# Patient Record
Sex: MALE | Race: BLACK OR AFRICAN AMERICAN | Employment: FULL TIME | URBAN - METROPOLITAN AREA
[De-identification: names, ages, dates, MRNs, and addresses within clinical notes are randomized per-mention and may not be internally consistent; named-entity substitution may affect disease eponyms.]

---

## 2021-10-17 ENCOUNTER — HOSPITAL ENCOUNTER (EMERGENCY)
Age: 56
Discharge: HOME | End: 2021-10-17
Payer: COMMERCIAL

## 2021-10-17 ENCOUNTER — APPOINTMENT (OUTPATIENT)
Dept: GENERAL RADIOLOGY | Age: 56
End: 2021-10-17
Payer: COMMERCIAL

## 2021-10-17 VITALS
WEIGHT: 202 LBS | SYSTOLIC BLOOD PRESSURE: 156 MMHG | OXYGEN SATURATION: 96 % | TEMPERATURE: 99.9 F | HEART RATE: 96 BPM | RESPIRATION RATE: 18 BRPM | DIASTOLIC BLOOD PRESSURE: 82 MMHG

## 2021-10-17 DIAGNOSIS — U07.1 COVID-19: ICD-10-CM

## 2021-10-17 DIAGNOSIS — R05.9 COUGH: Primary | ICD-10-CM

## 2021-10-17 PROCEDURE — 71046 X-RAY EXAM CHEST 2 VIEWS: CPT

## 2021-10-17 PROCEDURE — 99283 EMERGENCY DEPT VISIT LOW MDM: CPT

## 2021-10-17 RX ORDER — CITALOPRAM 40 MG/1
TABLET ORAL
COMMUNITY
Start: 2021-09-24

## 2021-10-17 RX ORDER — ATORVASTATIN CALCIUM 40 MG/1
TABLET, FILM COATED ORAL
COMMUNITY
Start: 2021-09-24

## 2021-10-17 RX ORDER — BENZONATATE 100 MG/1
100 CAPSULE ORAL 3 TIMES DAILY PRN
Qty: 30 CAPSULE | Refills: 0 | Status: SHIPPED | OUTPATIENT
Start: 2021-10-17 | End: 2021-10-24

## 2021-10-17 RX ORDER — AMLODIPINE BESYLATE 10 MG/1
TABLET ORAL
COMMUNITY
Start: 2021-09-24

## 2021-10-17 ASSESSMENT — ENCOUNTER SYMPTOMS
COUGH: 1
RHINORRHEA: 0
SHORTNESS OF BREATH: 0
VOMITING: 0
DIARRHEA: 0
ABDOMINAL PAIN: 0
NAUSEA: 0

## 2021-10-17 NOTE — ED PROVIDER NOTES
[facility]        Pt Name: Swetha Avilez  MRN: [id number]  [place] 1965  Date of evaluation: 10/17/2021  Provider: Umberto Groves PA-C  PCP: No primary care provider on file. Note Started: 2:38 PM EDT       OTTONIEL. I have evaluated this patient. My supervising physician was available for consultation. CHIEF COMPLAINT       Chief Complaint   Patient presents with    Positive For Covid-19     Pt reports COVID +, 10/11/2021, started steroid pack, feels congested today        HISTORY OF PRESENT ILLNESS   (Location, Timing/Onset, Context/Setting, Quality, Duration, Modifying Factors, Severity, Associated Signs and Symptoms)  Note limiting factors. Chief Complaint: Cough, COVID-19    Swetha Avilez is a 49 y.o. male who presents to the emergency department today for evaluation for cough, in relation to COVID-19. The patient states that he is not vaccinated. The patient states that for the past 10 days he has been experiencing fever, congestion, and some intermittent body aches. The patient states that initially he had a fever of 103, however this has resolved. The patient states that he is actually not had a fever within the past week. The patient states that he saw his primary care physician, and he states that he was tested positive for COVID-19 1 week ago. Patient states that he has had some nasal congestion, however today when he woke up he states he did have a cough. Patient states that his cough is dry, there is no sputum production or hemoptysis. The patient states he has not yet had a cough with his COVID-19 symptoms, this was concerning to him, and prompted his visit to the ED. The patient does not have any chest pain. He does not have any shortness of breath. Cough is essentially resolved at this time. Patient denies any abdominal pain, nausea, vomiting or diarrhea. No urinary symptoms.   The patient states that he has already completed a Z-Jaron and is currently on a Medrol Dosepak. Patient otherwise has no complaints at this time. Nursing Notes were all reviewed and agreed with or any disagreements were addressed in the HPI. REVIEW OF SYSTEMS    (2-9 systems for level 4, 10 or more for level 5)     Review of Systems   Constitutional: Negative for activity change, appetite change, chills and fever. HENT: Negative for congestion and rhinorrhea. Respiratory: Positive for cough. Negative for shortness of breath. Cardiovascular: Negative for chest pain. Gastrointestinal: Negative for abdominal pain, diarrhea, nausea and vomiting. Genitourinary: Negative for difficulty urinating, dysuria and hematuria. Positives and Pertinent negatives as per HPI. Except as noted above in the ROS, all other systems were reviewed and negative. PAST MEDICAL HISTORY   History reviewed. No pertinent past medical history. SURGICAL HISTORY   History reviewed. No pertinent surgical history. [address]       Discharge Medication List as of 10/17/2021  2:24 PM      CONTINUE these medications which have NOT CHANGED    Details   amLODIPine (NORVASC) 10 MG tablet Historical Med      atorvastatin (LIPITOR) 40 MG tablet Historical Med      citalopram (CELEXA) 40 MG tablet Historical Med      metFORMIN (GLUCOPHAGE) 1000 MG tablet Historical Med               ALLERGIES     Patient has no known allergies. FAMILYHISTORY     History reviewed. No pertinent family history.        SOCIAL HISTORY       Social History     Tobacco Use    Smoking status: Not on file   Substance Use Topics    Alcohol use: Not on file    Drug use: Not on file       SCREENINGS             PHYSICAL EXAM    (up to 7 for level 4, 8 or more for level 5)     ED Triage Vitals [10/17/21 1243]   BP Temp Temp src Pulse Resp SpO2 Height Weight   (!) 156/82 99.9 °F (37.7 °C) -- 96 18 96 % -- 202 lb (91.6 kg)       Physical Exam  Vitals and nursing note reviewed. Constitutional:       Appearance: He is well-developed. He is not diaphoretic. HENT:      Head: Normocephalic and atraumatic. Right Ear: External ear normal.      Left Ear: External ear normal.      Nose: Nose normal.   Eyes:      General:         Right eye: No discharge. Left eye: No discharge. Neck:      Trachea: No tracheal deviation. Cardiovascular:      Rate and Rhythm: Normal rate and regular rhythm. Heart sounds: No murmur heard. Pulmonary:      Effort: Pulmonary effort is normal. No respiratory distress. Comments: Diminished aeration throughout lungfields  Abdominal:      General: Bowel sounds are normal. There is no distension. Palpations: Abdomen is soft. Tenderness: There is no abdominal tenderness. There is no guarding. Musculoskeletal:         General: Normal range of motion. Cervical back: Normal range of motion and neck supple. Skin:     General: Skin is warm and dry. Neurological:      Mental Status: He is alert and oriented to person, place, and time. Psychiatric:         Behavior: Behavior normal.         DIAGNOSTIC RESULTS   LABS:    Labs Reviewed - No data to display    When ordered only abnormal lab results are displayed. All other labs were within normal range or not returned as of this dictation. EKG: When ordered, EKG's are interpreted by the Emergency Department Physician in the absence of a cardiologist.  Please see their note for interpretation of EKG. RADIOLOGY:   Non-plain film images such as CT, Ultrasound and MRI are read by the radiologist. Plain radiographic images are visualized and preliminarily interpreted by the ED Provider with the below findings:        Interpretation per the Radiologist below, if available at the time of this note:    XR CHEST (2 VW)   Final Result   Patchy peripheral parenchymal opacities are seen throughout the lungs,   compatible with pneumonia.   By report, there is a history of COVID-19   infection           XR CHEST (2 VW)    Result Date: 10/17/2021  EXAMINATION: TWO XRAY VIEWS OF THE CHEST 10/17/2021 1:09 pm COMPARISON: None. HISTORY: ORDERING SYSTEM PROVIDED HISTORY: Chest Discomfort TECHNOLOGIST PROVIDED HISTORY: Reason for exam:->Chest Discomfort Reason for Exam: Positive For Covid-19 (Pt reports COVID +, 10/11/2021, started steroid pack, feels congested today ) Acuity: Acute Type of Exam: Initial FINDINGS: Heart size is normal. Scattered patchy parenchymal opacities are seen throughout the lungs bilaterally, peripherally located. No pleural effusions noted. Patchy peripheral parenchymal opacities are seen throughout the lungs, compatible with pneumonia. By report, there is a history of COVID-19 infection           PROCEDURES   Unless otherwise noted below, none     Procedures    CRITICAL CARE TIME   N/A    CONSULTS:  None      EMERGENCY DEPARTMENT COURSE and DIFFERENTIAL DIAGNOSIS/MDM:   Vitals:    Vitals:    10/17/21 1243   BP: (!) 156/82   Pulse: 96   Resp: 18   Temp: 99.9 °F (37.7 °C)   SpO2: 96%   Weight: 202 lb (91.6 kg)       Patient was given the following medications:  Medications - No data to display        Briefly, this is a 53-year-old male who presents the emergency department today in relation to a cough secondary from COVID-19. Patient is currently on day 10 of symptoms, he states that initially started with a fever however this is resolved he states he is really just had some nasal congestion. The patient states that today he woke up and he did have a cough, he states that he has not yet had a cough with COVID-19 and this concerned him, and prompted his visit to the ED. Physical examination, very well-appearing patient, he is not tachycardic, tachypneic or hypoxic. He has diminished aeration throughout lung fields  Does show patchy  His chest x-ray peripheral parenchymal opacities throughout the lungs likely compatible with his COVID-19 pneumonia.     Patient is already on a Medrol Dosepak, he states he is already completed a Z-Jaron as well. Patient has no reports of any chest pain or shortness of breath, and overall feels well and would like to go home, I feel that this is reasonable as he does not otherwise have any symptoms, he is already on medications for this, and vital signs are stable. He will be given a prescription for Tessalon Perles if needed. Strict return precautions given. Otherwise to obtain follow-up with his primary care physician within 2 to 3 days for reevaluation. He is to return to the ED for any new or worsening symptoms, the patient voiced understanding is agreeable with plan. Stable for discharge. My suspicion is low at this time for pleural effusion, pneumothorax, acute respiratory distress, acute hypoxemia or other emergent etiology. FINAL IMPRESSION      1. Cough    2.  COVID-19          DISPOSITION/PLAN   DISPOSITION Decision To Discharge 10/17/2021 02:28:53 PM      PATIENT REFERRED TO:  Your primary care physician    Schedule an appointment as soon as possible for a visit in 2 days      [facility] Emergency Department  [address] [place] [address]  [phone]    As needed, If symptoms worsen      DISCHARGE MEDICATIONS:  Discharge Medication List as of 10/17/2021  2:24 PM      START taking these medications    Details   benzonatate (TESSALON PERLES) 100 MG capsule Take 1 capsule by mouth 3 times daily as needed for Cough, Disp-30 capsule, R-0Print             DISCONTINUED MEDICATIONS:  Discharge Medication List as of 10/17/2021  2:24 PM                 (Please note that portions of this note were completed with a voice recognition program.  Efforts were made to edit the dictations but occasionally words are mis-transcribed.)    Chente Vick PA-C (electronically signed)           Chente Vick PA-C  10/17/21 5903

## 2021-10-17 NOTE — ED NOTES
Pt Discharged in stable condition, VSS, no signs of distress, discharge instructions and meds reviewed. Pt verbalizes understanding and states no further questions or concerns unaddressed.        Lissa , RN  10/17/21 8500

## 2022-05-14 ENCOUNTER — OFFICE VISIT (OUTPATIENT)
Dept: ORTHOPEDIC SURGERY | Age: 57
End: 2022-05-14
Payer: COMMERCIAL

## 2022-05-14 DIAGNOSIS — S63.639A JERSEY FINGER, INITIAL ENCOUNTER: Primary | ICD-10-CM

## 2022-05-14 DIAGNOSIS — M79.642 LEFT HAND PAIN: ICD-10-CM

## 2022-05-14 PROCEDURE — 99203 OFFICE O/P NEW LOW 30 MIN: CPT | Performed by: STUDENT IN AN ORGANIZED HEALTH CARE EDUCATION/TRAINING PROGRAM

## 2022-05-14 NOTE — PROGRESS NOTES
CHIEF COMPLAINT: Left hand pain    DATE OF INJURY: 5/10/22    History:   Mr. Mari Chaudhry 56 y.o. right handed male presents today for first visit for evaluation of a left hand pain. The patient was self-referred to after hours clinic. This is evaluated as a personal. The pain began 4 days ago. He rates pain at 10/10 with movement, no pain at rest.   There was a history of injury. The patient was using a tool and his hand slipped. He felt a pop in his left hand and 4th digit. After the incident he was unable to flex the 4th DIP joint. The patient has not taken NSAIDs. The patient has used ice. History reviewed. No pertinent past medical history. History reviewed. No pertinent surgical history. Current Outpatient Medications on File Prior to Visit   Medication Sig Dispense Refill    amLODIPine (NORVASC) 10 MG tablet       atorvastatin (LIPITOR) 40 MG tablet       citalopram (CELEXA) 40 MG tablet       metFORMIN (GLUCOPHAGE) 1000 MG tablet        No current facility-administered medications on file prior to visit. No Known Allergies    Social History     Socioeconomic History    Marital status: Single     Spouse name: Not on file    Number of children: Not on file    Years of education: Not on file    Highest education level: Not on file   Occupational History    Not on file   Tobacco Use    Smoking status: Never Smoker    Smokeless tobacco: Never Used   Substance and Sexual Activity    Alcohol use: Not on file    Drug use: Not on file    Sexual activity: Not on file   Other Topics Concern    Not on file   Social History Narrative    Not on file     Social Determinants of Health     Financial Resource Strain:     Difficulty of Paying Living Expenses: Not on file   Food Insecurity:     Worried About Running Out of Food in the Last Year: Not on file    Joaquin of Food in the Last Year: Not on file   Transportation Needs:     Lack of Transportation (Medical):  Not on file    Lack of Transportation (Non-Medical): Not on file   Physical Activity:     Days of Exercise per Week: Not on file    Minutes of Exercise per Session: Not on file   Stress:     Feeling of Stress : Not on file   Social Connections:     Frequency of Communication with Friends and Family: Not on file    Frequency of Social Gatherings with Friends and Family: Not on file    Attends [employer] Services: Not on file    Active Member of [address] [place] [address] [employer] or Organizations: Not on file    Attends Club or Organization Meetings: Not on file    Marital Status: Not on file   Intimate Partner Violence:     Fear of Current or Ex-Partner: Not on file    Emotionally Abused: Not on file    Physically Abused: Not on file    Sexually Abused: Not on file   Housing Stability:     Unable to Pay for Housing in the Last Year: Not on file    Number of [place] in the Last Year: Not on file    Unstable Housing in the Last Year: Not on file       History reviewed. No pertinent family history. Review of Systems:  I have reviewed the clinically relevant past medical history, medications, allergies, family history, social history, and 13 point Review of Systems from the patient's recent history form & documented any details relevant to today's presenting complaints in the history above. The patient's self-reported past medical history, medications, allergies, family history, social history, and Review of Systems form from 5/14/22 have been scanned into the chart under the \"Media\" tab. Physical Examination:     Mr. Ayan Eason is a pleasant 56 y.o. male who presents today in no acute distress, awake, alert, and oriented. There is no swelling that can be seen, no change in the color. He has intact sensation to light touch throughout the bilateral hands in the median, radial, and ulnar nerve distribution and good radial pulses. EPL/FPL/Interossei are intact bilaterally.     He is tender along the flexor digitorum profundus tendon at the 4th digit. Flexion of the 4th DIP joint is absent. 4th DIP remains in extension compared to the other digits upon active finger flexion.  and flexion against resistance is painful along the volar aspect of the 4th digit flexor tendons. IMAGING:  Left hand Xray's:  3 views obtained and reviewed demonstrating No acute abnormality or fracture. I cannot appreciate an avulsion fracture. There is no soft tissue swelling. Assessment:     Left hand 4th digit Jersey Finger      Plan:     Natural history and expected course discussed. Questions answered. Buddy taping to adjacent finger. Continue ice as needed. I do suspect an avulsion/tear of the flexor digitorum profundus tendon as the patient is unable to actively flex the 4th DIP joint after sustaining an injury. Therefore, I will order an MRI to further evaluate the flexor tendons of the left hand as typical treatment for jersey finger injuries is surgery. Follow up with hand surgeon as soon as possible this week. Rowan Machuca PA-C  Board Certified by the [employer] on Certification of [address] and [address]: This note was generated with use of a verbal recognition program and an attempt was made to check for errors. It is possible that there are still dictated errors within this office note. If so, please bring any significant errors to my attention for an addendum. All efforts were made to ensure that this office note is accurate.

## 2022-05-16 ENCOUNTER — TELEPHONE (OUTPATIENT)
Dept: ORTHOPEDIC SURGERY | Age: 57
End: 2022-05-16

## 2022-05-16 NOTE — TELEPHONE ENCOUNTER
lucianam regarding MRI left hand approval and authorization being valid. Patient was instructed that their MRI needs to be scheduled at [facility]. The patient was instructed to contact the facility to schedule  at:     [address], [address] [facility]  [place], [address]    PHONE: [phone]  FAX: [phone]     A follow up appointment will need to be scheduled to review the results and treatment plan.

## 2022-05-27 ENCOUNTER — TELEPHONE (OUTPATIENT)
Dept: ORTHOPEDIC SURGERY | Age: 57
End: 2022-05-27

## 2022-05-27 NOTE — TELEPHONE ENCOUNTER
Pt cancelled appt with Marley on 5/20 and has not been seen by either her or CBW. Please schedule pt for new pt appt.

## 2022-05-27 NOTE — TELEPHONE ENCOUNTER
Surgery and/or Procedure Scheduling     Contact Name: Danni Davon  Surgical[facility]Procedure Request: [facility]  [address] [place] [address] ADVISE   Patient Contact Number: [phone]

## 2022-06-03 ENCOUNTER — OFFICE VISIT (OUTPATIENT)
Dept: ORTHOPEDIC SURGERY | Age: 57
End: 2022-06-03
Payer: COMMERCIAL

## 2022-06-03 VITALS — BODY MASS INDEX: 28.28 KG/M2 | WEIGHT: 202 LBS | RESPIRATION RATE: 16 BRPM | HEIGHT: 71 IN

## 2022-06-03 DIAGNOSIS — S63.639A TRAUMATIC RUPTURE OF TENDON OF DISTAL INTERPHALANGEAL (DIP) JOINT OF FINGER, INITIAL ENCOUNTER: Primary | ICD-10-CM

## 2022-06-03 PROCEDURE — 99244 OFF/OP CNSLTJ NEW/EST MOD 40: CPT | Performed by: ORTHOPAEDIC SURGERY

## 2022-06-03 NOTE — Clinical Note
Dear  Lucillie Ganser, MD,    Thank you very much for your referral or . Curry Eden to me for evaluation and treatment of his Hand & Wrist condition. I appreciate your confidence in me and thank you for allowing me the opportunity to care for your patients. If I can be of any further assistance to you on this or any other patient, please do not hesitate to contact me. Sincerely,    Dakota Salmeron.  Leonard Mosley MD

## 2022-06-04 NOTE — PATIENT INSTRUCTIONS
Thank you for choosing [facility]) Physicians for your Hand and Upper Extremity needs. If we can be of any further assistance to you, please do not hesitate to contact us.     Office Phone Number:  [phone]  or  [phone]

## 2022-06-04 NOTE — PROGRESS NOTES
Mr. Ning Boyce is a 56 y.o. right handed   who is seen today in Hand Surgical Consultation at the request of Cecelia Closs, MD.'    He is seen today regarding an injury occurring \"1 month ago\". He reports having sustained an injury to the  left Ring Finger when using a wrench and  Slipping off the wrench. At the time of injury, there was not skin injury & there was not acute sensory loss. He was seen for Emergency evaluation elsewhere, radiographs were obtained. By report, he was immediately unable to flex the distal Ring Finger  at the time. He reports mild pain located in the Volar aspect of the Ring Finger, no tenderness of the wrist or elbow. He notes today, no numbness in the Radial Digital Nerve and Ulnar Digital Nerve distribution of the Ring Finger. Symptoms show no change over time. I have today reviewed with Ning Boyce the clinically relevant, past medical history, medications, allergies,  family history, social history, and Review Of Systems & I have documented any details relevant to today's presenting complaints in my history above. Mr. Sushma Martínez self-reported past medical history, medications, allergies,  family history, social history, and Review Of Systems have been scanned into the chart under the \"Media\" tab. Physical Exam:  Mr. Sushma Martínez most recent vitals:  Vitals  Resp: 16  Height: 5' 11\" (180.3 cm)  Weight: 202 lb (91.6 kg)    He is well nourished, oriented to person, place & time. He demonstrates appropriate mood and affect as well as normal gait and station. Skin: is intact in the injured left Ring Finger, otherwise normal .  The nail plate is present. No other digits show sign of skin injury bilaterally. Digital range of motion is clearly diminished in the left Ring Finger otherwise normal bilaterally.  FDS function is Intact in the Ring Finger, FDP function is Absent in the Ring Finger, common extensor function is Intact in the Ring Finger. Thumb EPL Function is Intact, Thumb EPB Function is Intact, Thumb FPL Function is Intact. He does have a Palmaris Longus Tendon on the left side. Wrist range of motion is without significant limitation bilaterally. Sensation is subjectively normal in the Radial Digital Nerve and Ulnar Digital Nerve distribution of the Ring Finger, all other digits demonstrate normal sensation bilaterally  Vascular examination reveals normal and good capillary refill bilaterally. There is no acute ecchymosis about the region of injury  Swelling is mild in the Ring Finger on the Left, normal on the Right. There is no evidence of gross joint instability bilaterally. Muscular strength is clinically appropriate bilaterally. Maximal pain is elicited with palpation of the Volar region of the Ring Finger. The base of the hand & wrist are not tender to palpation. Impression:  Mr. Bertha Valero has sustained 3 month old left Ring Finger Flexor Digitorum Profundus (FDP) tendon avulsion injury ([facility])  without associated nerve inujury. He  presents requesting further treatment. Plan:  I have discussed with Mr. Bertha Valero the various treatment options for treatment of his 3 week old left Ring Finger Flexor Digitorum Profundus (FDP) tendon avulsion injury. We discussed the options of Conservative treatment, and Surgical treatment with the options of either a two-stage flexor tendon reconstruction or a simpler DIP joint arthrodesis (with/without excision of the FDP stump). I have explained the pre-, coleen- ane post-operative considerations to him.  he has elected to consider surgical treatment with possible two stage reconstruction. He has voiced an understanding of the other options available to him. I have explained the complications, limitations, expectations, alternatives, & risks of his chosen treatment.   We discussed the possibility of residual symptoms as may be related to surgical treatment of tendon injuries including the possiblities of: Infection, poor healing of tissues, persistant deformity, persistant pain, limitation of motion, future arthritic symptoms & the possible need for further treatment. We also specifically discussed risks related to any surgical procedure including: bleeding, infection, scar formation, & possible need for repeat operation. He understood our discussion and was comfortable with his decision; he was provided with appropriate expectations. I had an extensive discussion with Mr. Quan Carter  and any family members present regarding the natural history, etiology, and long term consequences of this problem. I have outlined a treatment plan with them and, in my opinion, surgical intervention is indicated at this time. I have discussed with them the potential complications, limitations, expectations, alternatives, and risks of the procedure. He has had full opportunity to ask their questions. I have answered them all to his satisfaction. I feel that the patient and any present family members do understand our discussion today and he does wish to proceed with left Ring Finger Flexor Digitorum Profundus (FDP) tendon  two-stage flexor tendon reconstruction. Prior to surgery, We discussed the option of pursuing a consultation with hand therapy and a prescription was provided to do so prior to scheduling. .       I have carefully explained to Quan Carter the intensive course of Hand Therapy which is required after repair of flexor tendon injuries, and he has agreed to consistently attend and fully participate is such therapy as I may direct. Mr. Quan Carter is to contact me after he visits with Hand Therapy to discuss further arrangements. He is specifically instructed to contact the office between now & his scheduled appointment if he has concerns related to the cast or the underlying fracture.   He is welcome to call for an appointment sooner if he has any additional concerns or questions.

## 2022-06-06 ENCOUNTER — TELEPHONE (OUTPATIENT)
Dept: ORTHOPEDIC SURGERY | Age: 57
End: 2022-06-06

## 2022-06-06 NOTE — TELEPHONE ENCOUNTER
Surgery and/or Procedure Scheduling     Contact Name: Gallito Medina Request: [facility]  Patient Contact Number: [phone]    The patient call and would like to schedule for sx and he would like to  Know if he can get his sx done at [facility] instead of [facility]. Please Advise.

## 2023-06-26 ENCOUNTER — ANESTHESIA EVENT (OUTPATIENT)
Dept: ENDOSCOPY | Age: 58
End: 2023-06-26
Payer: COMMERCIAL

## 2023-06-27 ENCOUNTER — HOSPITAL ENCOUNTER (OUTPATIENT)
Age: 58
Setting detail: OUTPATIENT SURGERY
Discharge: HOME | End: 2023-06-27
Attending: INTERNAL MEDICINE | Admitting: INTERNAL MEDICINE
Payer: COMMERCIAL

## 2023-06-27 ENCOUNTER — ANESTHESIA (OUTPATIENT)
Dept: ENDOSCOPY | Age: 58
End: 2023-06-27
Payer: COMMERCIAL

## 2023-06-27 VITALS
OXYGEN SATURATION: 100 % | BODY MASS INDEX: 30.1 KG/M2 | RESPIRATION RATE: 17 BRPM | TEMPERATURE: 97.2 F | DIASTOLIC BLOOD PRESSURE: 102 MMHG | HEART RATE: 76 BPM | HEIGHT: 71 IN | WEIGHT: 215 LBS | SYSTOLIC BLOOD PRESSURE: 150 MMHG

## 2023-06-27 DIAGNOSIS — Z12.11 COLON CANCER SCREENING: ICD-10-CM

## 2023-06-27 LAB
GLUCOSE BLD-MCNC: 78 MG/DL (ref 70–99)
PERFORMED ON: NORMAL

## 2023-06-27 PROCEDURE — 3700000001 HC ADD 15 MINUTES (ANESTHESIA): Performed by: INTERNAL MEDICINE

## 2023-06-27 PROCEDURE — 3609010600 HC COLONOSCOPY POLYPECTOMY SNARE/COLD BIOPSY: Performed by: INTERNAL MEDICINE

## 2023-06-27 PROCEDURE — 3700000000 HC ANESTHESIA ATTENDED CARE: Performed by: INTERNAL MEDICINE

## 2023-06-27 PROCEDURE — 2500000003 HC RX 250 WO HCPCS

## 2023-06-27 PROCEDURE — 6360000002 HC RX W HCPCS

## 2023-06-27 PROCEDURE — 2580000003 HC RX 258: Performed by: ANESTHESIOLOGY

## 2023-06-27 PROCEDURE — 7100000010 HC PHASE II RECOVERY - FIRST 15 MIN: Performed by: INTERNAL MEDICINE

## 2023-06-27 PROCEDURE — 7100000011 HC PHASE II RECOVERY - ADDTL 15 MIN: Performed by: INTERNAL MEDICINE

## 2023-06-27 PROCEDURE — 2709999900 HC NON-CHARGEABLE SUPPLY: Performed by: INTERNAL MEDICINE

## 2023-06-27 RX ORDER — PROPOFOL 10 MG/ML
INJECTION, EMULSION INTRAVENOUS PRN
Status: DISCONTINUED | OUTPATIENT
Start: 2023-06-27 | End: 2023-06-27 | Stop reason: SDUPTHER

## 2023-06-27 RX ORDER — SODIUM CHLORIDE 9 MG/ML
INJECTION, SOLUTION INTRAVENOUS CONTINUOUS
Status: DISCONTINUED | OUTPATIENT
Start: 2023-06-27 | End: 2023-06-27 | Stop reason: HOSPADM

## 2023-06-27 RX ORDER — ACETAMINOPHEN 160 MG
TABLET,DISINTEGRATING ORAL DAILY
COMMUNITY

## 2023-06-27 RX ORDER — M-VIT,TX,IRON,MINS/CALC/FOLIC 27MG-0.4MG
1 TABLET ORAL DAILY
COMMUNITY

## 2023-06-27 RX ORDER — LIDOCAINE HYDROCHLORIDE 20 MG/ML
INJECTION, SOLUTION INFILTRATION; PERINEURAL PRN
Status: DISCONTINUED | OUTPATIENT
Start: 2023-06-27 | End: 2023-06-27 | Stop reason: SDUPTHER

## 2023-06-27 RX ADMIN — PROPOFOL 50 MG: 10 INJECTION, EMULSION INTRAVENOUS at 13:41

## 2023-06-27 RX ADMIN — PROPOFOL 50 MG: 10 INJECTION, EMULSION INTRAVENOUS at 13:46

## 2023-06-27 RX ADMIN — PROPOFOL 50 MG: 10 INJECTION, EMULSION INTRAVENOUS at 13:39

## 2023-06-27 RX ADMIN — PROPOFOL 280 MG: 10 INJECTION, EMULSION INTRAVENOUS at 13:32

## 2023-06-27 RX ADMIN — PROPOFOL 50 MG: 10 INJECTION, EMULSION INTRAVENOUS at 13:36

## 2023-06-27 RX ADMIN — PROPOFOL 50 MG: 10 INJECTION, EMULSION INTRAVENOUS at 13:43

## 2023-06-27 RX ADMIN — SODIUM CHLORIDE: 9 INJECTION, SOLUTION INTRAVENOUS at 13:27

## 2023-06-27 RX ADMIN — LIDOCAINE HYDROCHLORIDE 100 MG: 20 INJECTION, SOLUTION INFILTRATION; PERINEURAL at 13:32

## 2023-06-27 ASSESSMENT — PAIN - FUNCTIONAL ASSESSMENT: PAIN_FUNCTIONAL_ASSESSMENT: NONE - DENIES PAIN
